# Patient Record
Sex: MALE | Race: WHITE | NOT HISPANIC OR LATINO
[De-identification: names, ages, dates, MRNs, and addresses within clinical notes are randomized per-mention and may not be internally consistent; named-entity substitution may affect disease eponyms.]

---

## 2017-03-13 PROBLEM — Z00.00 ENCOUNTER FOR PREVENTIVE HEALTH EXAMINATION: Status: ACTIVE | Noted: 2017-03-13

## 2017-03-15 ENCOUNTER — APPOINTMENT (OUTPATIENT)
Dept: HEMATOLOGY ONCOLOGY | Facility: CLINIC | Age: 72
End: 2017-03-15

## 2017-03-15 ENCOUNTER — RESULT REVIEW (OUTPATIENT)
Age: 72
End: 2017-03-15

## 2017-03-15 VITALS
WEIGHT: 204 LBS | RESPIRATION RATE: 12 BRPM | HEART RATE: 78 BPM | DIASTOLIC BLOOD PRESSURE: 80 MMHG | TEMPERATURE: 96.2 F | SYSTOLIC BLOOD PRESSURE: 139 MMHG

## 2017-03-15 DIAGNOSIS — R97.20 ELEVATED PROSTATE, SPECIFIC ANTIGEN [PSA]: ICD-10-CM

## 2017-03-15 DIAGNOSIS — N28.89 OTHER SPECIFIED DISORDERS OF KIDNEY AND URETER: ICD-10-CM

## 2017-03-15 DIAGNOSIS — R31.9 HEMATURIA, UNSPECIFIED: ICD-10-CM

## 2017-03-16 ENCOUNTER — RESULT REVIEW (OUTPATIENT)
Age: 72
End: 2017-03-16

## 2017-03-16 DIAGNOSIS — N39.0 URINARY TRACT INFECTION, SITE NOT SPECIFIED: ICD-10-CM

## 2017-03-16 LAB
APPEARANCE UR: NORMAL
BILIRUB UR QL STRIP: NEGATIVE
COLOR UR: NORMAL
GLUCOSE UR STRIP-MCNC: NEGATIVE MG/DL
HGB UR QL STRIP: ABNORMAL
KETONES UR STRIP-MCNC: NEGATIVE MG/DL
NITRITE UR QL STRIP: NEGATIVE
PH UR STRIP: 6
PROT UR STRIP-MCNC: >=300 MG/DL
PSA FREE FLD-MCNC: 0.95 NG/ML
PSA FREE FLD-MCNC: 12.1 %
PSA FREE MFR FLD: 7.82 NG/ML
RBC #/AREA URNS HPF: ABNORMAL P/HPF
SP GR UR STRIP: 1.02
URINE COMP/EPITH (NORTH): ABNORMAL
UROBILINOGEN UR STRIP-MCNC: 0.2 MG/DL
WBC URNS QL MICRO: ABNORMAL
WBC URNS QL MICRO: ABNORMAL P/HPF

## 2017-03-20 PROBLEM — N28.89 RENAL MASS, RIGHT: Status: ACTIVE | Noted: 2017-03-20

## 2017-03-20 LAB — BACTERIA UR CULT: NORMAL

## 2017-03-29 ENCOUNTER — APPOINTMENT (OUTPATIENT)
Dept: HEMATOLOGY ONCOLOGY | Facility: CLINIC | Age: 72
End: 2017-03-29

## 2017-03-29 VITALS
WEIGHT: 206 LBS | RESPIRATION RATE: 12 BRPM | HEIGHT: 72 IN | BODY MASS INDEX: 27.9 KG/M2 | HEART RATE: 67 BPM | TEMPERATURE: 97.5 F

## 2017-04-07 ENCOUNTER — OTHER (OUTPATIENT)
Age: 72
End: 2017-04-07

## 2017-04-19 ENCOUNTER — APPOINTMENT (OUTPATIENT)
Dept: HEMATOLOGY ONCOLOGY | Facility: CLINIC | Age: 72
End: 2017-04-19

## 2017-04-19 ENCOUNTER — RESULT REVIEW (OUTPATIENT)
Age: 72
End: 2017-04-19

## 2017-04-19 VITALS
SYSTOLIC BLOOD PRESSURE: 130 MMHG | WEIGHT: 204 LBS | DIASTOLIC BLOOD PRESSURE: 93 MMHG | BODY MASS INDEX: 27.63 KG/M2 | TEMPERATURE: 96.8 F | HEART RATE: 75 BPM | RESPIRATION RATE: 12 BRPM | HEIGHT: 72 IN

## 2017-04-19 DIAGNOSIS — C65.9 MALIGNANT NEOPLASM OF UNSPECIFIED RENAL PELVIS: ICD-10-CM

## 2017-04-20 ENCOUNTER — RESULT REVIEW (OUTPATIENT)
Age: 72
End: 2017-04-20

## 2017-04-20 LAB
APPEARANCE UR: NORMAL
BACTERIA URNS QL MICRO: ABNORMAL
BILIRUB UR QL STRIP: ABNORMAL
COLOR UR: NORMAL
GLUCOSE UR STRIP-MCNC: NEGATIVE MG/DL
HGB UR QL STRIP: ABNORMAL
KETONES UR STRIP-MCNC: NEGATIVE MG/DL
MUCOUS THREADS URNS QL MICRO: ABNORMAL
NITRITE UR QL STRIP: NEGATIVE
PH UR STRIP: 6
PROT UR STRIP-MCNC: >=300 MG/DL
RBC #/AREA URNS HPF: ABNORMAL P/HPF
SP GR UR STRIP: 1.02
URINE COMP/EPITH (NORTH): ABNORMAL
UROBILINOGEN UR STRIP-MCNC: 1 MG/DL
WBC URNS QL MICRO: ABNORMAL
WBC URNS QL MICRO: ABNORMAL P/HPF

## 2017-04-21 ENCOUNTER — APPOINTMENT (OUTPATIENT)
Dept: HEMATOLOGY ONCOLOGY | Facility: CLINIC | Age: 72
End: 2017-04-21

## 2017-04-21 VITALS
BODY MASS INDEX: 27.63 KG/M2 | HEIGHT: 72 IN | SYSTOLIC BLOOD PRESSURE: 140 MMHG | WEIGHT: 204 LBS | HEART RATE: 79 BPM | RESPIRATION RATE: 12 BRPM | DIASTOLIC BLOOD PRESSURE: 94 MMHG | TEMPERATURE: 97 F

## 2017-04-21 DIAGNOSIS — Z78.9 OTHER SPECIFIED HEALTH STATUS: ICD-10-CM

## 2017-04-21 DIAGNOSIS — K50.90 CROHN'S DISEASE, UNSPECIFIED, W/OUT COMPLICATIONS: ICD-10-CM

## 2017-04-21 DIAGNOSIS — Z87.09 PERSONAL HISTORY OF OTHER DISEASES OF THE RESPIRATORY SYSTEM: ICD-10-CM

## 2017-04-21 DIAGNOSIS — Z72.0 TOBACCO USE: ICD-10-CM

## 2017-04-21 DIAGNOSIS — J98.4 OTHER DISORDERS OF LUNG: ICD-10-CM

## 2017-04-21 DIAGNOSIS — K21.9 GASTRO-ESOPHAGEAL REFLUX DISEASE W/OUT ESOPHAGITIS: ICD-10-CM

## 2017-04-21 DIAGNOSIS — Z80.1 FAMILY HISTORY OF MALIGNANT NEOPLASM OF TRACHEA, BRONCHUS AND LUNG: ICD-10-CM

## 2017-04-27 ENCOUNTER — OUTPATIENT (OUTPATIENT)
Dept: OUTPATIENT SERVICES | Facility: HOSPITAL | Age: 72
LOS: 1 days | Discharge: HOME | End: 2017-04-27

## 2017-05-03 ENCOUNTER — OUTPATIENT (OUTPATIENT)
Dept: OUTPATIENT SERVICES | Facility: HOSPITAL | Age: 72
LOS: 1 days | Discharge: HOME | End: 2017-05-03

## 2017-05-03 ENCOUNTER — APPOINTMENT (OUTPATIENT)
Dept: HEMATOLOGY ONCOLOGY | Facility: CLINIC | Age: 72
End: 2017-05-03

## 2017-05-03 VITALS
TEMPERATURE: 98 F | HEIGHT: 72 IN | HEART RATE: 69 BPM | SYSTOLIC BLOOD PRESSURE: 128 MMHG | WEIGHT: 204 LBS | RESPIRATION RATE: 12 BRPM | BODY MASS INDEX: 27.63 KG/M2 | DIASTOLIC BLOOD PRESSURE: 90 MMHG

## 2017-05-04 ENCOUNTER — OUTPATIENT (OUTPATIENT)
Dept: OUTPATIENT SERVICES | Facility: HOSPITAL | Age: 72
LOS: 1 days | Discharge: HOME | End: 2017-05-04

## 2017-05-09 ENCOUNTER — APPOINTMENT (OUTPATIENT)
Dept: HEMATOLOGY ONCOLOGY | Facility: CLINIC | Age: 72
End: 2017-05-09

## 2017-06-28 DIAGNOSIS — C64.1 MALIGNANT NEOPLASM OF RIGHT KIDNEY, EXCEPT RENAL PELVIS: ICD-10-CM

## 2017-06-28 DIAGNOSIS — N28.89 OTHER SPECIFIED DISORDERS OF KIDNEY AND URETER: ICD-10-CM

## 2017-07-12 DIAGNOSIS — R31.9 HEMATURIA, UNSPECIFIED: ICD-10-CM

## 2017-07-12 DIAGNOSIS — C65.9 MALIGNANT NEOPLASM OF UNSPECIFIED RENAL PELVIS: ICD-10-CM

## 2017-07-25 DIAGNOSIS — Z01.818 ENCOUNTER FOR OTHER PREPROCEDURAL EXAMINATION: ICD-10-CM

## 2018-04-17 ENCOUNTER — INPATIENT (INPATIENT)
Facility: HOSPITAL | Age: 73
LOS: 1 days | Discharge: HOME | End: 2018-04-19
Attending: INTERNAL MEDICINE | Admitting: INTERNAL MEDICINE

## 2018-04-17 VITALS
SYSTOLIC BLOOD PRESSURE: 97 MMHG | TEMPERATURE: 98 F | RESPIRATION RATE: 20 BRPM | DIASTOLIC BLOOD PRESSURE: 62 MMHG | OXYGEN SATURATION: 100 % | HEART RATE: 93 BPM
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
## 2018-04-17 DIAGNOSIS — Z87.438 PERSONAL HISTORY OF OTHER DISEASES OF MALE GENITAL ORGANS: Chronic | ICD-10-CM

## 2018-04-17 DIAGNOSIS — Z98.890 OTHER SPECIFIED POSTPROCEDURAL STATES: Chronic | ICD-10-CM

## 2018-04-17 DIAGNOSIS — K41.20 BILATERAL FEMORAL HERNIA, WITHOUT OBSTRUCTION OR GANGRENE, NOT SPECIFIED AS RECURRENT: Chronic | ICD-10-CM

## 2018-04-17 LAB
ANION GAP SERPL CALC-SCNC: 13 MMOL/L — SIGNIFICANT CHANGE UP (ref 7–14)
APTT BLD: 29.9 SEC — SIGNIFICANT CHANGE UP (ref 27–39.2)
BASE EXCESS BLDV CALC-SCNC: 0.1 MMOL/L — SIGNIFICANT CHANGE UP (ref -2–2)
BASOPHILS # BLD AUTO: 0.04 K/UL — SIGNIFICANT CHANGE UP (ref 0–0.2)
BASOPHILS NFR BLD AUTO: 0.2 % — SIGNIFICANT CHANGE UP (ref 0–1)
BUN SERPL-MCNC: 23 MG/DL — HIGH (ref 10–20)
CA-I SERPL-SCNC: 1.12 MMOL/L — SIGNIFICANT CHANGE UP (ref 1.12–1.3)
CALCIUM SERPL-MCNC: 7.9 MG/DL — LOW (ref 8.5–10.1)
CHLORIDE SERPL-SCNC: 95 MMOL/L — LOW (ref 98–110)
CO2 SERPL-SCNC: 22 MMOL/L — SIGNIFICANT CHANGE UP (ref 17–32)
CREAT SERPL-MCNC: 1.4 MG/DL — SIGNIFICANT CHANGE UP (ref 0.7–1.5)
EOSINOPHIL # BLD AUTO: 0.23 K/UL — SIGNIFICANT CHANGE UP (ref 0–0.7)
EOSINOPHIL NFR BLD AUTO: 1.4 % — SIGNIFICANT CHANGE UP (ref 0–8)
GAS PNL BLDV: 133 MMOL/L — LOW (ref 136–145)
GAS PNL BLDV: SIGNIFICANT CHANGE UP
GLUCOSE SERPL-MCNC: 104 MG/DL — HIGH (ref 70–99)
HCO3 BLDV-SCNC: 23 MMOL/L — SIGNIFICANT CHANGE UP (ref 22–29)
HCT VFR BLD CALC: 28.5 % — LOW (ref 42–52)
HCT VFR BLDA CALC: 26.9 % — LOW (ref 34–44)
HGB BLD CALC-MCNC: 8.8 G/DL — LOW (ref 14–18)
HGB BLD-MCNC: 8.9 G/DL — LOW (ref 14–18)
IMM GRANULOCYTES NFR BLD AUTO: 0.7 % — HIGH (ref 0.1–0.3)
INR BLD: 1.34 RATIO — HIGH (ref 0.65–1.3)
LACTATE BLDV-MCNC: 1 MMOL/L — SIGNIFICANT CHANGE UP (ref 0.5–1.6)
LYMPHOCYTES # BLD AUTO: 0.72 K/UL — LOW (ref 1.2–3.4)
LYMPHOCYTES # BLD AUTO: 4.4 % — LOW (ref 20.5–51.1)
MCHC RBC-ENTMCNC: 28.4 PG — SIGNIFICANT CHANGE UP (ref 27–31)
MCHC RBC-ENTMCNC: 31.2 G/DL — LOW (ref 32–37)
MCV RBC AUTO: 91.1 FL — SIGNIFICANT CHANGE UP (ref 80–94)
MONOCYTES # BLD AUTO: 0.82 K/UL — HIGH (ref 0.1–0.6)
MONOCYTES NFR BLD AUTO: 5 % — SIGNIFICANT CHANGE UP (ref 1.7–9.3)
NEUTROPHILS # BLD AUTO: 14.37 K/UL — HIGH (ref 1.4–6.5)
NEUTROPHILS NFR BLD AUTO: 88.3 % — HIGH (ref 42.2–75.2)
PCO2 BLDV: 29 MMHG — LOW (ref 41–51)
PH BLDV: 7.5 — HIGH (ref 7.26–7.43)
PLATELET # BLD AUTO: 273 K/UL — SIGNIFICANT CHANGE UP (ref 130–400)
PO2 BLDV: 179 MMHG — HIGH (ref 20–40)
POTASSIUM BLDV-SCNC: 3.6 MMOL/L — SIGNIFICANT CHANGE UP (ref 3.3–5.6)
POTASSIUM SERPL-MCNC: 8.3 MMOL/L — CRITICAL HIGH (ref 3.5–5)
POTASSIUM SERPL-SCNC: 8.3 MMOL/L — CRITICAL HIGH (ref 3.5–5)
PROTHROM AB SERPL-ACNC: 14.6 SEC — HIGH (ref 9.95–12.87)
RBC # BLD: 3.13 M/UL — LOW (ref 4.7–6.1)
RBC # FLD: 17 % — HIGH (ref 11.5–14.5)
SAO2 % BLDV: 100 % — SIGNIFICANT CHANGE UP
SODIUM SERPL-SCNC: 130 MMOL/L — LOW (ref 135–146)
TYPE + AB SCN PNL BLD: SIGNIFICANT CHANGE UP
WBC # BLD: 16.3 K/UL — HIGH (ref 4.8–10.8)
WBC # FLD AUTO: 16.3 K/UL — HIGH (ref 4.8–10.8)

## 2018-04-17 RX ORDER — METOCLOPRAMIDE HCL 10 MG
10 TABLET ORAL ONCE
Qty: 0 | Refills: 0 | Status: COMPLETED | OUTPATIENT
Start: 2018-04-17 | End: 2018-04-17

## 2018-04-17 RX ORDER — SODIUM CHLORIDE 9 MG/ML
1000 INJECTION INTRAMUSCULAR; INTRAVENOUS; SUBCUTANEOUS
Qty: 0 | Refills: 0 | Status: DISCONTINUED | OUTPATIENT
Start: 2018-04-17 | End: 2018-04-19

## 2018-04-17 RX ORDER — SODIUM CHLORIDE 9 MG/ML
1000 INJECTION INTRAMUSCULAR; INTRAVENOUS; SUBCUTANEOUS ONCE
Qty: 0 | Refills: 0 | Status: COMPLETED | OUTPATIENT
Start: 2018-04-17 | End: 2018-04-17

## 2018-04-17 RX ADMIN — Medication 10 MILLIGRAM(S): at 22:46

## 2018-04-17 RX ADMIN — SODIUM CHLORIDE 100 MILLILITER(S): 9 INJECTION INTRAMUSCULAR; INTRAVENOUS; SUBCUTANEOUS at 23:28

## 2018-04-17 RX ADMIN — SODIUM CHLORIDE 2000 MILLILITER(S): 9 INJECTION INTRAMUSCULAR; INTRAVENOUS; SUBCUTANEOUS at 19:53

## 2018-04-17 NOTE — H&P ADULT - NSHPLABSRESULTS_GEN_ALL_CORE
8.9    16.30 )-----------( 273      ( 17 Apr 2018 18:40 )             28.5     04-17  130<L>  |  95<L>  |  23<H>  ----------------------------<  104<H>  8.3<HH>   |  22  |  1.4  Ca    7.9<L>      17 Apr 2018 18:40  PT/INR - ( 17 Apr 2018 18:40 )   PT: 14.60 sec;   INR: 1.34 ratio    PTT - ( 17 Apr 2018 18:40 )  PTT:29.9 sec

## 2018-04-17 NOTE — ED PROVIDER NOTE - PHYSICAL EXAMINATION
CONSTITUTIONAL: Well-developed; well-nourished; in no acute distress.   SKIN: warm, dry  EYES: no conj injection. EOMI, pale conjunctiva  ENT: No nasal discharge; airway clear.  CARD: S1, S2 normal  RESP: clear to auscultation bilaterally   ABD: soft nondistended. nontender. no rebound or guarding  EXT: Normal ROM.  5/5 strength bilaterally.  normal gait.   NEURO: Alert, oriented, grossly unremarkable

## 2018-04-17 NOTE — H&P ADULT - NSHPPHYSICALEXAM_GEN_ALL_CORE
General: underweight, pale gentleman, reclining in bed, in NAD  Cardiac: RRR, S1S2  Lungs: CTAB  Abd: NTND, +BS, no pain on palpation of all 4 quadrants  LE: no swelling  Neuro: AAOx3, no focal deficits General: underweight, pale gentleman, reclining in bed, in NAD  Cardiac: RRR, S1S2  Lungs: CTAB  Abd: NTND, +BS, no pain on palpation of all 4 quadrants, guaiac neg  LE: no swelling  Neuro: AAOx3, no focal deficits General: underweight, pale gentleman, reclining in bed, in NAD  Cardiac: RRR, S1S2  Lungs: CTAB  Abd: NTND, +BS, no pain on palpation of all 4 quadrants, guaiac neg, +constipation  LE: no swelling  Neuro: AAOx3, no focal deficits

## 2018-04-17 NOTE — H&P ADULT - ATTENDING COMMENTS
Patient was evaluated and examined by bedside, no c/o abdominal pain, tolerating diet well.    All labs, radiology studies, VS was reviewed  I have reviewed medical plan outlined by Medical resident as stated above.  -Significant weight loss with h/o malignancy, f/up with repeated CT abdomen and pelvis  -CXR/ f/up U/A  -advance diet slowly as tolerated  -recently dx. gastric/duodenal ulcers- on PPI/Carafate tx.  -Anemia with decreased Hemoglobin level - no gross bleeding, f/up anemia work up, CBC in am  -Cachexia- moderate malnourishment- ensure supplementation

## 2018-04-17 NOTE — H&P ADULT - HISTORY OF PRESENT ILLNESS
73 y/o M with PMHx R renal cell carcinoma s/p radiation dx March 2016, last radiation treatment in August 2017, finding of GI ulcer 2/2 radiation, inactive Crohns, acid reflux, and asthma presenting for chronic nausea, vomiting, and decreased appetite. Since the radiation, pt has been experiencing nausea and vomiting since February. He does not always vomit, but some foods like eggs will trigger the vomiting; it is nonbilious but there are sometimes streaks of blood at the end of the vomitus. Pt also states he has since lost 40 pounds unintentionally. For these sx pt had EGD done by GI Dr Villanueva 2 weeks ago, which as per him showed 1 oozing ulcer but no active bleeding. Pt's last colonoscopy was done 3 years ago, which showed +Crohns and 2 polyps, but no malignancy; he is scheduled for a repeat colo this year. The pt states he has also developed iron def anemia after the radiation requiring venofer. Pt now sent into the ER by Dr Villanueva bc his sx of nausea, vomiting, and decreased po intake have been nonremiting. As per pt, Dr Villanueva attributes all of these sx to radiation side effects.

## 2018-04-17 NOTE — ED PROVIDER NOTE - MEDICAL DECISION MAKING DETAILS
vomtiing s/p radiation with duodenitis, unable to tolerate po, will admit for iv hydration, antiemetics, and for Dr. Villanueva to evaluate for possible need for endoscopy vs diet modification.

## 2018-04-17 NOTE — H&P ADULT - NSHPREVIEWOFSYSTEMS_GEN_ALL_CORE
General: +40 pound unintentional weight loss, neg fevers, chills  Cardiac: no chest pain, dyspnea  Lungs: no resp distress  Abd: +nausea and vomiting (occurs every other day or two, some streaks of blood at end of vomiting, +burning at L side of abdomen from his ulcer

## 2018-04-17 NOTE — ED PROVIDER NOTE - NS ED ROS FT
ENMT:  no otalgia. no sore throat  Cardiac:  No chest pain or sob.  Respiratory:  No cough or respiratory distress.  GI:  (+) nausea and vomiting. (+) chronic constipation.  no abd pain  :  normal urination. no burning   MS:  no myalgia. no back pain  Neuro:  No headache or focal weakness  Skin:  No skin rash.

## 2018-04-17 NOTE — ED PROVIDER NOTE - OBJECTIVE STATEMENT
72 M pmh renal carcinoma s/p radiation and anemia presents for nausea, vomiting and weight loss.  patient reports last radiation last may.  patient with persistent nausea and vomiting every several days.  not tolerating many foods po.  reports recent endoscopy with dr odell that showed bleeding of duodenum.  patient reports dark stool, on iron supplementation.  denies abd pain.  no diarrhea, h/o constipation.  no fever/chills

## 2018-04-17 NOTE — H&P ADULT - ASSESSMENT
73 y/o M with PMHx R renal cell carcinoma s/p radiation dx March 2016, last radiation treatment in August 2017, finding of GI ulcer 2/2 radiation, inactive Crohns, acid reflux, and asthma presenting for chronic nausea, vomiting, and decreased appetite since February. For these sx pt had EGD done by GI Dr Villanueva 2 weeks ago, which as per him showed 1 oozing ulcer but no active bleeding.    1. Nausea, Vomiting, Decreased PO Intake Likely 2/2 Radiation Side Effects   -sx control for now, IVF, antiemetics, c/w carafate, protonix in place of ranitidine  -call Dr Villanueva office [place] am ([phone]) and obtain official results of EGD results and for any further recommendations  -advance diet slowly, calorie count, dietary referral  2. Crohn's Disease: c/w mesalamine  3. Asthma: c/w symbicort, proair, flonase, montelukast  4. 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 For these sx pt had EGD done by GI Dr Villanueva 2 weeks ago, which as per him showed 1 oozing ulcer but no active bleeding.    1. Nausea, Vomiting, Decreased PO Intake Likely 2/2 Radiation Side Effects, Resulting in Dehydration, Hyponatremia  -sx control for now, IVF, antiemetics, c/w carafate, protonix in place of ranitidine  -call Dr Villanueva office [place] am ([phone]) and obtain official results of EGD results and for any further recommendations  -advance diet slowly, calorie count, dietary referral  -leukocytosis 2/2 stress reaction, no signs of infection, pt appears nontoxic   2. Crohn's Disease: c/w mesalamine  3. Asthma: c/w symbicort, proair, flonase, montelukast  4. 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 DVT PPx: hep 5000q8  6. hyperkalemia: hemolyzed, repeat wnl 71 y/o M with PMHx R renal cell carcinoma s/p radiation dx March 2016, last radiation treatment in August 2017, finding of GI ulcer 2/2 radiation, inactive Crohns, acid reflux, and asthma presenting for chronic nausea, vomiting, and decreased appetite since February. For these sx pt had EGD done by GI Dr Villanueva 2 weeks ago, which as per him showed 1 oozing ulcer but no active bleeding.    1. Nausea, Vomiting, Decreased PO Intake Likely 2/2 Radiation Side Effects, Resulting in Dehydration, Hyponatremia  -sx control for now, IVF, antiemetics, c/w carafate, protonix in place of ranitidine  -call Dr Villanueva office [place] am ([phone]) and obtain official results of EGD results and for any further recommendations  -advance diet slowly, calorie count, dietary referral  -leukocytosis 2/2 stress reaction, no signs of infection, pt appears nontoxic   2. Crohn's Disease: c/w mesalamine  3. Asthma: c/w symbicort, proair, flonase, montelukast  4. 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 DVT PPx: hep 5000q8  6. hyperkalemia: hemolyzed, repeat wnl  7. CKD Stage 3A: avoid nephrotoxic agents

## 2018-04-18 LAB
ANION GAP SERPL CALC-SCNC: 10 MMOL/L — SIGNIFICANT CHANGE UP (ref 7–14)
APPEARANCE UR: (no result)
BACTERIA # UR AUTO: (no result) /HPF
BASOPHILS # BLD AUTO: 0.02 K/UL — SIGNIFICANT CHANGE UP (ref 0–0.2)
BASOPHILS NFR BLD AUTO: 0.2 % — SIGNIFICANT CHANGE UP (ref 0–1)
BILIRUB UR-MCNC: NEGATIVE — SIGNIFICANT CHANGE UP
BUN SERPL-MCNC: 19 MG/DL — SIGNIFICANT CHANGE UP (ref 10–20)
CALCIUM SERPL-MCNC: 7.6 MG/DL — LOW (ref 8.5–10.1)
CHLORIDE SERPL-SCNC: 101 MMOL/L — SIGNIFICANT CHANGE UP (ref 98–110)
CO2 SERPL-SCNC: 23 MMOL/L — SIGNIFICANT CHANGE UP (ref 17–32)
COLOR SPEC: YELLOW — SIGNIFICANT CHANGE UP
CREAT SERPL-MCNC: 1.2 MG/DL — SIGNIFICANT CHANGE UP (ref 0.7–1.5)
DIFF PNL FLD: NEGATIVE — SIGNIFICANT CHANGE UP
EOSINOPHIL # BLD AUTO: 0.36 K/UL — SIGNIFICANT CHANGE UP (ref 0–0.7)
EOSINOPHIL NFR BLD AUTO: 3.1 % — SIGNIFICANT CHANGE UP (ref 0–8)
EPI CELLS # UR: (no result) /HPF
GLUCOSE SERPL-MCNC: 97 MG/DL — SIGNIFICANT CHANGE UP (ref 70–99)
GLUCOSE UR QL: NEGATIVE MG/DL — SIGNIFICANT CHANGE UP
HCT VFR BLD CALC: 23.8 % — LOW (ref 42–52)
HGB BLD-MCNC: 7.6 G/DL — LOW (ref 14–18)
IMM GRANULOCYTES NFR BLD AUTO: 0.6 % — HIGH (ref 0.1–0.3)
IRON SATN MFR SERPL: 10 % — LOW (ref 15–50)
IRON SATN MFR SERPL: 15 UG/DL — LOW (ref 35–150)
KETONES UR-MCNC: NEGATIVE — SIGNIFICANT CHANGE UP
LEUKOCYTE ESTERASE UR-ACNC: (no result)
LYMPHOCYTES # BLD AUTO: 0.58 K/UL — LOW (ref 1.2–3.4)
LYMPHOCYTES # BLD AUTO: 5 % — LOW (ref 20.5–51.1)
MAGNESIUM SERPL-MCNC: 2 MG/DL — SIGNIFICANT CHANGE UP (ref 1.8–2.4)
MCHC RBC-ENTMCNC: 29 PG — SIGNIFICANT CHANGE UP (ref 27–31)
MCHC RBC-ENTMCNC: 31.9 G/DL — LOW (ref 32–37)
MCV RBC AUTO: 90.8 FL — SIGNIFICANT CHANGE UP (ref 80–94)
MONOCYTES # BLD AUTO: 0.7 K/UL — HIGH (ref 0.1–0.6)
MONOCYTES NFR BLD AUTO: 6.1 % — SIGNIFICANT CHANGE UP (ref 1.7–9.3)
NEUTROPHILS # BLD AUTO: 9.77 K/UL — HIGH (ref 1.4–6.5)
NEUTROPHILS NFR BLD AUTO: 85 % — HIGH (ref 42.2–75.2)
NITRITE UR-MCNC: NEGATIVE — SIGNIFICANT CHANGE UP
PH UR: 6 — SIGNIFICANT CHANGE UP (ref 5–8)
PLATELET # BLD AUTO: 223 K/UL — SIGNIFICANT CHANGE UP (ref 130–400)
POTASSIUM SERPL-MCNC: 4.4 MMOL/L — SIGNIFICANT CHANGE UP (ref 3.5–5)
POTASSIUM SERPL-SCNC: 4.4 MMOL/L — SIGNIFICANT CHANGE UP (ref 3.5–5)
PROT UR-MCNC: 30 MG/DL
RBC # BLD: 2.62 M/UL — LOW (ref 4.7–6.1)
RBC # FLD: 16.7 % — HIGH (ref 11.5–14.5)
SODIUM SERPL-SCNC: 134 MMOL/L — LOW (ref 135–146)
SP GR SPEC: 1.02 — SIGNIFICANT CHANGE UP (ref 1.01–1.03)
TIBC SERPL-MCNC: 155 UG/DL — LOW (ref 220–430)
UIBC SERPL-MCNC: 140 UG/DL — SIGNIFICANT CHANGE UP (ref 110–370)
UROBILINOGEN FLD QL: 0.2 MG/DL — SIGNIFICANT CHANGE UP (ref 0.2–0.2)
WBC # BLD: 11.5 K/UL — HIGH (ref 4.8–10.8)
WBC # FLD AUTO: 11.5 K/UL — HIGH (ref 4.8–10.8)
WBC UR QL: (no result) /HPF

## 2018-04-18 RX ORDER — MONTELUKAST 4 MG/1
10 TABLET, CHEWABLE ORAL AT BEDTIME
Qty: 0 | Refills: 0 | Status: DISCONTINUED | OUTPATIENT
Start: 2018-04-18 | End: 2018-04-19

## 2018-04-18 RX ORDER — SUCRALFATE 1 G
1 TABLET ORAL
Qty: 0 | Refills: 0 | Status: DISCONTINUED | OUTPATIENT
Start: 2018-04-18 | End: 2018-04-19

## 2018-04-18 RX ORDER — MESALAMINE 400 MG
2400 TABLET, DELAYED RELEASE (ENTERIC COATED) ORAL DAILY
Qty: 0 | Refills: 0 | Status: DISCONTINUED | OUTPATIENT
Start: 2018-04-18 | End: 2018-04-18

## 2018-04-18 RX ORDER — PANTOPRAZOLE SODIUM 20 MG/1
40 TABLET, DELAYED RELEASE ORAL
Qty: 0 | Refills: 0 | Status: DISCONTINUED | OUTPATIENT
Start: 2018-04-18 | End: 2018-04-18

## 2018-04-18 RX ORDER — HEPARIN SODIUM 5000 [USP'U]/ML
5000 INJECTION INTRAVENOUS; SUBCUTANEOUS EVERY 8 HOURS
Qty: 0 | Refills: 0 | Status: DISCONTINUED | OUTPATIENT
Start: 2018-04-18 | End: 2018-04-19

## 2018-04-18 RX ORDER — PANTOPRAZOLE SODIUM 20 MG/1
40 TABLET, DELAYED RELEASE ORAL
Qty: 0 | Refills: 0 | Status: DISCONTINUED | OUTPATIENT
Start: 2018-04-18 | End: 2018-04-19

## 2018-04-18 RX ORDER — FERROUS SULFATE 325(65) MG
325 TABLET ORAL DAILY
Qty: 0 | Refills: 0 | Status: DISCONTINUED | OUTPATIENT
Start: 2018-04-18 | End: 2018-04-19

## 2018-04-18 RX ORDER — ONDANSETRON 8 MG/1
4 TABLET, FILM COATED ORAL EVERY 6 HOURS
Qty: 0 | Refills: 0 | Status: DISCONTINUED | OUTPATIENT
Start: 2018-04-18 | End: 2018-04-19

## 2018-04-18 RX ORDER — MESALAMINE 400 MG
800 TABLET, DELAYED RELEASE (ENTERIC COATED) ORAL THREE TIMES A DAY
Qty: 0 | Refills: 0 | Status: DISCONTINUED | OUTPATIENT
Start: 2018-04-18 | End: 2018-04-19

## 2018-04-18 RX ORDER — ALBUTEROL 90 UG/1
2 AEROSOL, METERED ORAL EVERY 6 HOURS
Qty: 0 | Refills: 0 | Status: DISCONTINUED | OUTPATIENT
Start: 2018-04-18 | End: 2018-04-19

## 2018-04-18 RX ORDER — BUDESONIDE AND FORMOTEROL FUMARATE DIHYDRATE 160; 4.5 UG/1; UG/1
2 AEROSOL RESPIRATORY (INHALATION)
Qty: 0 | Refills: 0 | Status: DISCONTINUED | OUTPATIENT
Start: 2018-04-18 | End: 2018-04-19

## 2018-04-18 RX ORDER — ONDANSETRON 8 MG/1
8 TABLET, FILM COATED ORAL ONCE
Qty: 0 | Refills: 0 | Status: COMPLETED | OUTPATIENT
Start: 2018-04-18 | End: 2018-04-18

## 2018-04-18 RX ORDER — ONDANSETRON 8 MG/1
8 TABLET, FILM COATED ORAL EVERY 6 HOURS
Qty: 0 | Refills: 0 | Status: DISCONTINUED | OUTPATIENT
Start: 2018-04-18 | End: 2018-04-18

## 2018-04-18 RX ADMIN — PANTOPRAZOLE SODIUM 40 MILLIGRAM(S): 20 TABLET, DELAYED RELEASE ORAL at 19:18

## 2018-04-18 RX ADMIN — Medication 1 GRAM(S): at 20:32

## 2018-04-18 RX ADMIN — Medication 325 MILLIGRAM(S): at 12:54

## 2018-04-18 RX ADMIN — PANTOPRAZOLE SODIUM 40 MILLIGRAM(S): 20 TABLET, DELAYED RELEASE ORAL at 06:19

## 2018-04-18 RX ADMIN — Medication 800 MILLIGRAM(S): at 17:02

## 2018-04-18 RX ADMIN — Medication 1 GRAM(S): at 12:54

## 2018-04-18 RX ADMIN — BUDESONIDE AND FORMOTEROL FUMARATE DIHYDRATE 2 PUFF(S): 160; 4.5 AEROSOL RESPIRATORY (INHALATION) at 08:44

## 2018-04-18 RX ADMIN — Medication 1 GRAM(S): at 06:19

## 2018-04-18 RX ADMIN — ONDANSETRON 108 MILLIGRAM(S): 8 TABLET, FILM COATED ORAL at 17:09

## 2018-04-18 NOTE — CHART NOTE - NSCHARTNOTEFT_GEN_A_CORE
Upon Nutritional Assessment by the Registered Dietitian your patient was determined to meet criteria / has evidence of the following diagnosis/diagnoses:          [ ]  Mild Protein Calorie Malnutrition        [ ]  Moderate Protein Calorie Malnutrition        [x] Severe Protein Calorie Malnutrition        [ ] Unspecified Protein Calorie Malnutrition        [ ] Underweight / BMI <19        [ ] Morbid Obesity / BMI > 40      Findings as based on:  •  Comprehensive nutrition assessment and consultation    Pt with severe protein calorie malnutrition of chronic illness as pt with po ~50% over 3 months and wt loss of 18.9% (35 lbs) within 3 months.      Treatment:    The following diet has been recommended:  Please consider changing diet order to dysphagia 2 with no concentrated potassium foods and order Nepro Vanilla BID and Ensure Pudding Vanilla q 24hrs.      PROVIDER Section:     By signing this assessment you are acknowledging and agree with the diagnosis/diagnoses assigned by the Registered Dietitian    Comments:

## 2018-04-18 NOTE — CONSULT NOTE ADULT - ASSESSMENT
73 y/o M with PMHx R renal cell carcinoma s/p radiation dx March 2017, last radiation treatment in August 2017, finding of GI ulcer 2/2 radiation, inactive Crohns, acid reflux, and asthma presenting for chronic nausea, vomiting with streaks of blood, and decreased appetite with weight loss. To note Pt's last endoscopy- 2 weeks ago revealed: Multiple duodenal ulcers- severe, Erosive gastritis.     1) Erosive gastritis/Multiple duodenal ulcers/ friable mucosa- probably 2/2 to radiation  Please refer to endoscopy results in chart  Hb closer to baseline. Guaiac negative on admission.  No evidence of active bleed.   c/w protonix po q12h, sucralfate  Pending ulcer biopsy results  Avoid Nsaids/Aspirin  Follow with outpt GI Dr Villanueva 71 y/o M with PMHx R renal cell carcinoma s/p radiation dx March 2017, last radiation treatment in August 2017, finding of GI ulcer 2/2 radiation, inactive Crohns, acid reflux, and asthma presenting for chronic nausea, vomiting with streaks of blood, and decreased appetite with weight loss. To note Pt's last endoscopy- 2 weeks ago revealed: Multiple duodenal ulcers- severe, Erosive gastritis.     1) Erosive gastritis/Multiple duodenal ulcers/ friable mucosa- probably 2/2 to radiation  Repeat endoscopy recommended but declined by the patient.  Should his pain return and he is amenable we will readdress.  Hb closer to baseline. Guaiac negative on admission.  No evidence of active bleed.   c/w protonix po q12h, sucralfate  Pending ulcer biopsy results  Avoid Nsaids/Aspirin  Follow with outpt GI Dr Villanueva

## 2018-04-18 NOTE — DIETITIAN INITIAL EVALUATION ADULT. - ENERGY NEEDS
(8903-4427 kcal/day = MSJ x 1.2-1.3 AF + 250 to promote wt gain of 0.5 lbs/wk)  ( g/day = 15-20% of energy needs); fluid needs: 1 ml :1 kcal or per LIP

## 2018-04-18 NOTE — CONSULT NOTE ADULT - ATTENDING COMMENTS
Pt seen and examined with Med Res and GI team.  I agree with management plan.  Pts symptoms have now resolved and although recommended he has declined repeat procedure stating that he his here to build up his nutritional status.  Continue PPI.  Call Heme/Onc.  Call nutrition consult.

## 2018-04-18 NOTE — DIETITIAN INITIAL EVALUATION ADULT. - ORAL INTAKE PTA
poor/pt reports intake decreased since January consuming 50% or less of prior baseline intake, pt mainly consuming oatmeal, farina, milkshakes, ice cream, protein shakes (mainly consuming bland carb foods and high calorie shakes as pt was able to tolerate these foods)

## 2018-04-18 NOTE — DIETITIAN INITIAL EVALUATION ADULT. - OTHER INFO
Pt reports UBW ~185 lbs in January with decreased appetite/intake and GI distress / less tolerance to foods. Pt with persistent nausea and vomiting (last episode of emesis 4/16). Pt reports receiving radiation for kidney cancer and believes that has irritated GI tract and causes pt to get sick when consuming certain foods. (Reason for assessment: consult for assessment, calorie count, edu) Pt reports UBW ~185 lbs in January with decreased appetite/intake and GI distress / less tolerance to foods. Pt with persistent nausea and vomiting (last episode of emesis 4/16). Pt reports receiving radiation for kidney cancer and believes that has irritated GI tract and causes pt to get sick when consuming certain foods. Calorie count hung: results due 4/21. (Reason for assessment: consult for assessment, calorie count, edu)

## 2018-04-18 NOTE — DIETITIAN INITIAL EVALUATION ADULT. - ADHERENCE
pt reports restriction of beef to diet over the past few months (he was advised to avoid as it takes long to digest)

## 2018-04-18 NOTE — CONSULT NOTE ADULT - ASSESSMENT
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  anemia    NS at 100 cc/hr x 24-48 hours  PPI  GI eval  avoid nsaids  salty snacks  spoke with resident

## 2018-04-18 NOTE — PROGRESS NOTE ADULT - ASSESSMENT
73 y/o M with PMHx R renal cell carcinoma s/p radiation dx March 2016, last radiation treatment in August 2017, finding of GI ulcer 2/2 radiation, inactive Crohns, acid reflux, and asthma presenting for chronic nausea, vomiting, and decreased appetite since February. For these sx pt had EGD done by GI Dr Villanueva 2 weeks ago, which as per him showed 1 oozing ulcer but no active bleeding.    1. Nausea, Vomiting, Decreased PO Intake  -sx control for now, IVF, antiemetics, c/w carafate, Protonix BID   - Received Medical records from Dr Villanueva   -advance diet slowly, calorie count, dietary referral  -leukocytosis 2/2 stress reaction, no signs of infection, pt appears nontoxic     2.Iron Deficiency Anemia after Radiation:   -baseline hgb before radiation was 10-11, currently at new baseline of 8, no signs of lower GI bleed, guaiac neg  - Iron studies check CBC tomorrow     3 H/o Weight loss   40 lbs in couple of months   - Can be metastasis of cancer  - CXR and CT abdomne and pelvis   - SPoke with the patient he had an PET scan at [facility] and it was in remission     4. Crohn's Disease: c/w mesalamine    5. Asthma: c/w symbicort, proair, montelukast    6. DVT PPx: hep 5000q8    7. CKD Stage 3A: avoid nephrotoxic agents    8. Dispo- Home

## 2018-04-19 ENCOUNTER — TRANSCRIPTION ENCOUNTER (OUTPATIENT)
Age: 73
End: 2018-04-19

## 2018-04-19 VITALS — OXYGEN SATURATION: 99 %

## 2018-04-19 LAB
FOLATE SERPL-MCNC: 7.2 NG/ML — SIGNIFICANT CHANGE UP
VIT B12 SERPL-MCNC: 445 PG/ML — SIGNIFICANT CHANGE UP (ref 232–1245)

## 2018-04-19 RX ORDER — MONTELUKAST 4 MG/1
1 TABLET, CHEWABLE ORAL
Qty: 0 | Refills: 0 | COMMUNITY
Start: 2018-04-19

## 2018-04-19 RX ORDER — ONDANSETRON 8 MG/1
4 TABLET, FILM COATED ORAL
Qty: 84 | Refills: 0 | OUTPATIENT
Start: 2018-04-19 | End: 2018-04-25

## 2018-04-19 RX ORDER — PANTOPRAZOLE SODIUM 20 MG/1
1 TABLET, DELAYED RELEASE ORAL
Qty: 20 | Refills: 0 | OUTPATIENT
Start: 2018-04-19 | End: 2018-04-28

## 2018-04-19 RX ORDER — MESALAMINE 400 MG
2 TABLET, DELAYED RELEASE (ENTERIC COATED) ORAL
Qty: 0 | Refills: 0 | COMMUNITY
Start: 2018-04-19

## 2018-04-19 RX ORDER — FERROUS SULFATE 325(65) MG
1 TABLET ORAL
Qty: 90 | Refills: 0 | OUTPATIENT
Start: 2018-04-19 | End: 2018-05-18

## 2018-04-19 RX ORDER — SUCRALFATE 1 G
10 TABLET ORAL
Qty: 70 | Refills: 0 | OUTPATIENT
Start: 2018-04-19 | End: 2018-04-25

## 2018-04-19 RX ADMIN — SODIUM CHLORIDE 100 MILLILITER(S): 9 INJECTION INTRAMUSCULAR; INTRAVENOUS; SUBCUTANEOUS at 11:28

## 2018-04-19 RX ADMIN — Medication 1 GRAM(S): at 11:30

## 2018-04-19 RX ADMIN — Medication 1 GRAM(S): at 05:14

## 2018-04-19 NOTE — DISCHARGE NOTE ADULT - PATIENT PORTAL LINK FT
You can access the [facility] Patient Portal, offered by [facility], by registering with the following website: [email or web address]

## 2018-04-19 NOTE — DISCHARGE NOTE ADULT - PLAN OF CARE
Diagnose and treat Take the medications prescribed and follow up with the medications Follow up with the oncologist, patient needs outpatient oncology follow up treat the underlying cause Follow up with Gi and oncologist

## 2018-04-19 NOTE — PROGRESS NOTE ADULT - SUBJECTIVE AND OBJECTIVE BOX
Discharge Note:  ROSA M PAREKH  [facility] T2-3A 026 A ([facility] T2-3A)            Patient was evaluated and examined  by bedside, tolerating diet well, no abdominal pain, no N/V/D. patient refused to have CT abdomen/pelvis and CXR to be done for diagnostic work up.            REVIEW OF SYSTEMS:  please see pertinent positives mentioned above, all other 12 ROS negative      T(C): , Max: 36.8 (04-19-18 @ 05:33)  HR: 76 (04-19-18 @ 06:05)  BP: 102/66 (04-19-18 @ 06:05)  RR: 17 (04-19-18 @ 05:33)  SpO2: 99% (04-19-18 @ 07:50)  CAPILLARY BLOOD GLUCOSE          PHYSICAL EXAM:  General: NAD, AAOX3, patient is laying comfortably in bed, cachectic  HEENT: AT, NC, Supple, NO JVD, NO CB  Lungs: CTA B/L, no wheezing, no rhonchi  CVS: normal S1, S2, RRR, NO M/G/R  Abdomen: soft, bowel sounds present, non-tender, non-distended  Extremities: no edema, no clubbing, no cyanosis, positive peripheral pulses b/l  Neuro: no acute focal neurological deficits  Skin: no rush, no ecchymosis      LAB  CBC  Date: 04-18-18 @ 10:45  Mean cell Xsxywlwoka32.0  Mean cell Hemoglobin Conc31.9  Mean cell Volum 90.8  Platelet count-Automate 223  RBC Count 2.62  Red Cell Distrib Width16.7  WBC Count11.50  % Albumin, Urine--  Hematocrit 23.8  Hemoglobin 7.6  CBC  Date: 04-17-18 @ 18:40  Mean cell Urrwnzckjp24.4  Mean cell Hemoglobin Conc31.2  Mean cell Volum 91.1  Platelet count-Automate 273  RBC Count 3.13  Red Cell Distrib Width17.0  WBC Count16.30  % Albumin, Urine--  Hematocrit 28.5  Hemoglobin 8.9    BMP  04-18-18 @ 10:45  Blood Gas Arterial-Calcium,Ionized--  Blood Urea Nitrogen, Serum 19 mg/dL [10 - 20]  Carbon Dioxide, Serum23 mmol/L [17 - 32]  Chloride, Ssbjy237 mmol/L [98 - 110]  Creatinie, Serum1.2 mg/dL [0.7 - 1.5]  Glucose, Serum97 mg/dL [70 - 99]  Potassium, Serum4.4 mmol/L [3.5 - 5.0]  Sodium, Serum 134 mmol/L<L> [135 - 146]  BMP  04-17-18 @ 18:40  Blood Gas Arterial-Calcium,Ionized--  Blood Urea Nitrogen, Serum 23 mg/dL<H> [10 - 20]  Carbon Dioxide, Serum22 mmol/L [17 - 32] [Hemolyzed. Interpret with caution]  Chloride, Serum95 mmol/L<L> [98 - 110]  Creatinie, Serum1.4 mg/dL [0.7 - 1.5]  Glucose, Bhgpa372 mg/dL<H> [70 - 99]  Potassium, Serum8.3 mmol/L<HH> [3.5 - 5.0] [moderate hemolysis  TYPE:(C=Critical, N=Notification, A=Abnormal) _  TESTS: K  DATE/TIME CALLED: _04/17/18 20:58  CALLED TO: _DR. SAVAGE  READ BACK (2 Patient Identifiers)(Y/N): _Y  READ BACK VALUES (Y/N): _Y  CALLED BY: _ML]  Sodium, Serum 130 mmol/L<L> [135 - 146]        PT/INR - ( 17 Apr 2018 18:40 )   PT: 14.60 sec;   INR: 1.34 ratio         PTT - ( 17 Apr 2018 18:40 )  PTT:29.9 sec              Medications:  ALBUTerol    90 MICROgram(s) HFA Inhaler 2 Puff(s) Inhalation every 6 hours PRN  buDESOnide  80 MICROgram(s)/formoterol 4.5 MICROgram(s) Inhaler 2 Puff(s) Inhalation two times a day  ferrous    sulfate 325 milliGRAM(s) Oral daily  heparin  Injectable 5000 Unit(s) SubCutaneous every 8 hours  mesalamine DR Capsule 800 milliGRAM(s) Oral three times a day  montelukast 10 milliGRAM(s) Oral at bedtime  ondansetron Injectable 4 milliGRAM(s) IV Push every 6 hours PRN  pantoprazole    Tablet 40 milliGRAM(s) Oral two times a day  sodium chloride 0.9%. 1000 milliLiter(s) IV Continuous <Continuous>  sucralfate 1 Gram(s) Oral four times a day  sucralfate suspension 1 Gram(s) Oral two times a day        Assessment and Plan:  1. Nausea, Vomiting, Decreased PO Intake- resolved post medical therapy. h/o gastritis/duodenitis  - continue  antiemetics, c/w carafate, Protonix BID   - Received Medical records from Dr Villanueva   -patient tolerating diet well  -leukocytosis 2/2 stress reaction, no signs of infection, pt appears nontoxic   -Patient was referred to f/up with outpatient GI specialist    2.Iron Deficiency Anemia after Radiation:   -baseline hgb before radiation was 10-11, currently at new baseline of 8, no signs of lower GI bleed, guaiac neg  - started on IRon supplement    3 H/o Weight loss   40 lbs in couple of months   - Can be metastasis of cancer  - CXR and CT abdomne and pelvis - patient refused.   - SPoke with the patient has scheduled PET scan procedure as an outpatient and requests to do all work up as an outpatient    4. Crohn's Disease: c/w mesalamine    5. Asthma: c/w symbicort, proair, montelukast    6. DVT PPx: hep 5000q8    7. CKD Stage 3A: avoid nephrotoxic agents    8. Dispo- d/c home today    Patient verbalized good understanding of d/c instructions and agreed with d/c plan
NEPHROLOGY FOLLOW UP:    Pt seen and examined earlier today.  For dc home today.  Renal function improved.  Pt requesting further w/u as oupt.    PAST MEDICAL & SURGICAL HISTORY:  Chronic kidney disease  Acid reflux  Asthma  Crohn disease  Renal cell carcinoma  Bilateral femoral hernia  H/O hydrocele  H/O colonoscopy with polypectomy    Allergies:  contrast media (gadolinium-based) (Hives)  sulfa drugs (Unknown)    Home Medications Reviewed    SOCIAL HISTORY:  Denies ETOH,Smoking,   FAMILY HISTORY:        REVIEW OF SYSTEMS:  All other review of systems is negative unless indicated above.    PHYSICAL EXAM:  NAD  Awake and alert  thin  dry mm  cta b/l  rrr  soft, no cvat  no edema  no [facility] Medications:   MEDICATIONS  (STANDING):  buDESOnide  80 MICROgram(s)/formoterol 4.5 MICROgram(s) Inhaler 2 Puff(s) Inhalation two times a day  ferrous    sulfate 325 milliGRAM(s) Oral daily  heparin  Injectable 5000 Unit(s) SubCutaneous every 8 hours  mesalamine DR Capsule 800 milliGRAM(s) Oral three times a day  montelukast 10 milliGRAM(s) Oral at bedtime  pantoprazole    Tablet 40 milliGRAM(s) Oral two times a day  sodium chloride 0.9%. 1000 milliLiter(s) (100 mL/Hr) IV Continuous <Continuous>  sucralfate 1 Gram(s) Oral four times a day  sucralfate suspension 1 Gram(s) Oral two times a day        VITALS:  T(F): 98.2 (18 @ 05:33), Max: 98.2 (18 @ 05:33)  HR: 76 (18 @ 06:05)  BP: 102/66 (18 @ 06:05)  RR: 17 (18 @ 05:33)  SpO2: 99% (18 @ 07:50)  Wt(kg): --     @ 07:01  -   @ 07:00  --------------------------------------------------------  IN: 1000 mL / OUT: 0 mL / NET: 1000 mL     07:01  -   @ 07:00  --------------------------------------------------------  IN: 100 mL / OUT: 0 mL / NET: 100 mL          LABS:      134<L>  |  101  |  19  ----------------------------<  97  4.4   |  23  |  1.2    Ca    7.6<L>      2018 10:45  Mg     2.0                                 7.6    11.50 )-----------( 223      ( 2018 10:45 )             23.8       Urine Studies:  Urinalysis Basic - ( 2018 17:26 )    Color: Yellow / Appearance: Cloudy / S.020 / pH:   Gluc:  / Ketone: Negative  / Bili: Negative / Urobili: 0.2 mg/dL   Blood:  / Protein: 30 mg/dL / Nitrite: Negative   Leuk Esterase: Small / RBC:  / WBC 10-25 /HPF   Sq Epi:  / Non Sq Epi: Few /HPF / Bacteria: Moderate /HPF          RADIOLOGY & ADDITIONAL STUDIES:
SUBJECTIVE:    Patient is a 72y old Male who presents with a chief complaint of radiation side effects (17 Apr 2018 23:35)    Currently admitted to medicine with the primary diagnosis of Gastritis     Today is hospital day 1d. No events overnight. C/o nausea     PAST MEDICAL & SURGICAL HISTORY  Chronic kidney disease  Acid reflux  Asthma  Crohn disease  Renal cell carcinoma  Bilateral femoral hernia  H/O hydrocele  H/O colonoscopy with polypectomy      ALLERGIES:  contrast media (gadolinium-based) (Hives)  sulfa drugs (Unknown)    MEDICATIONS:  STANDING MEDICATIONS  buDESOnide  80 MICROgram(s)/formoterol 4.5 MICROgram(s) Inhaler 2 Puff(s) Inhalation two times a day  ferrous    sulfate 325 milliGRAM(s) Oral daily  heparin  Injectable 5000 Unit(s) SubCutaneous every 8 hours  mesalamine DR Capsule 800 milliGRAM(s) Oral three times a day  montelukast 10 milliGRAM(s) Oral at bedtime  pantoprazole    Tablet 40 milliGRAM(s) Oral before breakfast  sodium chloride 0.9%. 1000 milliLiter(s) IV Continuous <Continuous>  sucralfate 1 Gram(s) Oral four times a day    PRN MEDICATIONS  ALBUTerol    90 MICROgram(s) HFA Inhaler 2 Puff(s) Inhalation every 6 hours PRN  ondansetron Injectable 4 milliGRAM(s) IV Push every 6 hours PRN    VITALS:   T(F): 96.2  HR: 77  BP: 110/64  RR: 18  SpO2: 100%    LABS:                        7.6    11.50 )-----------( 223      ( 18 Apr 2018 10:45 )             23.8     04-18    134<L>  |  101  |  19  ----------------------------<  97  4.4   |  23  |  1.2    Ca    7.6<L>      18 Apr 2018 10:45  Mg     2.0     04-18      PT/INR - ( 17 Apr 2018 18:40 )   PT: 14.60 sec;   INR: 1.34 ratio         PTT - ( 17 Apr 2018 18:40 )  PTT:29.9 sec      PHYSICAL EXAM:  GEN: No acute distress  LUNGS: Clear to auscultation bilaterally   HEART: S1/S2 present. RRR.   ABD: Soft, non-tender, non-distended. Bowel sounds present  EXT: NC/NC/NE/2+PP/WHITTAKER  NEURO: AAOX3

## 2018-04-19 NOTE — DISCHARGE NOTE ADULT - HOSPITAL COURSE
73 y/o M with PMHx R renal cell carcinoma s/p radiation dx March 2016, last radiation treatment in August 2017, finding of GI ulcer 2/2 radiation, inactive Crohns, acid reflux, and asthma presenting for chronic nausea, vomiting, and decreased appetite.    Patient is being discharged with protonix and carafate. patient refused EGD and CT scan as he will follow up with his oncologist and Gi doctor.

## 2018-04-19 NOTE — DISCHARGE NOTE ADULT - MEDICATION SUMMARY - MEDICATIONS TO TAKE
I will START or STAY ON the medications listed below when I get home from the hospital:    mesalamine 400 mg oral delayed release capsule  -- 2 cap(s) by mouth 3 times a day  -- Indication: For Crohn disease    ondansetron  -- 4 milligram(s) by mouth 3 times a day MDD:take no more than 3 tablets a day trake as needed    -- Indication: For Gastritis    montelukast 10 mg oral tablet  -- 1 tab(s) by mouth once a day (at bedtime)  -- Indication: For Asthma    sucralfate 1 g/10 mL oral suspension  -- 10 milliliter(s) by mouth 4 times a day   -- Indication: For Acid reflux    pantoprazole 40 mg oral delayed release tablet  -- 1 tab(s) by mouth 2 times a day  -- Indication: For Gastritis

## 2018-04-19 NOTE — PROGRESS NOTE ADULT - ASSESSMENT
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  anemia    for dc home  keep hydrated PO  f/u bmp 2-4 weeks  oupt f/u with gi and oncologist  oupt renal f/u

## 2018-04-19 NOTE — DISCHARGE NOTE ADULT - CARE PLAN
Principal Discharge DX:	Gastritis  Goal:	Diagnose and treat  Assessment and plan of treatment:	Take the medications prescribed and follow up with the medications  Secondary Diagnosis:	Renal cell carcinoma  Assessment and plan of treatment:	Follow up with the oncologist, patient needs outpatient oncology follow up  Secondary Diagnosis:	Weight loss  Goal:	treat the underlying cause  Assessment and plan of treatment:	Follow up with Gi and oncologist

## 2018-04-20 ENCOUNTER — EMERGENCY (EMERGENCY)
Facility: HOSPITAL | Age: 73
LOS: 0 days | Discharge: HOME | End: 2018-04-20
Attending: EMERGENCY MEDICINE | Admitting: EMERGENCY MEDICINE

## 2018-04-20 VITALS
DIASTOLIC BLOOD PRESSURE: 57 MMHG | SYSTOLIC BLOOD PRESSURE: 91 MMHG | OXYGEN SATURATION: 99 % | TEMPERATURE: 99 F | HEART RATE: 100 BPM | RESPIRATION RATE: 18 BRPM

## 2018-04-20 DIAGNOSIS — Z88.2 ALLERGY STATUS TO SULFONAMIDES: ICD-10-CM

## 2018-04-20 DIAGNOSIS — K27.9 PEPTIC ULCER, SITE UNSPECIFIED, UNSPECIFIED AS ACUTE OR CHRONIC, WITHOUT HEMORRHAGE OR PERFORATION: ICD-10-CM

## 2018-04-20 DIAGNOSIS — Z87.438 PERSONAL HISTORY OF OTHER DISEASES OF MALE GENITAL ORGANS: Chronic | ICD-10-CM

## 2018-04-20 DIAGNOSIS — Z98.890 OTHER SPECIFIED POSTPROCEDURAL STATES: Chronic | ICD-10-CM

## 2018-04-20 DIAGNOSIS — Z91.041 RADIOGRAPHIC DYE ALLERGY STATUS: ICD-10-CM

## 2018-04-20 DIAGNOSIS — K41.20 BILATERAL FEMORAL HERNIA, WITHOUT OBSTRUCTION OR GANGRENE, NOT SPECIFIED AS RECURRENT: Chronic | ICD-10-CM

## 2018-04-20 DIAGNOSIS — Z79.899 OTHER LONG TERM (CURRENT) DRUG THERAPY: ICD-10-CM

## 2018-04-20 DIAGNOSIS — R10.13 EPIGASTRIC PAIN: ICD-10-CM

## 2018-04-20 RX ORDER — DIPHENHYDRAMINE HYDROCHLORIDE AND LIDOCAINE HYDROCHLORIDE AND ALUMINUM HYDROXIDE AND MAGNESIUM HYDRO
30 KIT ONCE
Qty: 0 | Refills: 0 | Status: COMPLETED | OUTPATIENT
Start: 2018-04-20 | End: 2018-04-20

## 2018-04-20 RX ADMIN — DIPHENHYDRAMINE HYDROCHLORIDE AND LIDOCAINE HYDROCHLORIDE AND ALUMINUM HYDROXIDE AND MAGNESIUM HYDRO 30 MILLILITER(S): KIT at 14:48

## 2018-04-20 NOTE — ED ADULT NURSE NOTE - ASSOCIATED SYMPTOMS
tender on palpation to left upper quadrant  NO nausea or vomiting at this time.  Patient states he had 1 episode of vomiting last night and nausea last night and pain was worse last night

## 2018-04-20 NOTE — ED PROVIDER NOTE - OBJECTIVE STATEMENT
73 y/o M with hx of PUD with recent discharge presents with brief episode of epigastric pain last night after eating soup. Patient denies any pain right now. Pain is resolved. No CP, SOB. No nausea, vomiting. No bloody stools.

## 2018-05-04 DIAGNOSIS — T66.XXXA RADIATION SICKNESS, UNSPECIFIED, INITIAL ENCOUNTER: ICD-10-CM

## 2018-05-04 DIAGNOSIS — E86.0 DEHYDRATION: ICD-10-CM

## 2018-05-04 DIAGNOSIS — R11.2 NAUSEA WITH VOMITING, UNSPECIFIED: ICD-10-CM

## 2018-05-04 DIAGNOSIS — E87.5 HYPERKALEMIA: ICD-10-CM

## 2018-05-04 DIAGNOSIS — K50.90 CROHN'S DISEASE, UNSPECIFIED, WITHOUT COMPLICATIONS: ICD-10-CM

## 2018-05-04 DIAGNOSIS — Z53.29 PROCEDURE AND TREATMENT NOT CARRIED OUT BECAUSE OF PATIENT'S DECISION FOR OTHER REASONS: ICD-10-CM

## 2018-05-04 DIAGNOSIS — J45.909 UNSPECIFIED ASTHMA, UNCOMPLICATED: ICD-10-CM

## 2018-05-04 DIAGNOSIS — Z85.528 PERSONAL HISTORY OF OTHER MALIGNANT NEOPLASM OF KIDNEY: ICD-10-CM

## 2018-05-04 DIAGNOSIS — K25.9 GASTRIC ULCER, UNSPECIFIED AS ACUTE OR CHRONIC, WITHOUT HEMORRHAGE OR PERFORATION: ICD-10-CM

## 2018-05-04 DIAGNOSIS — E87.1 HYPO-OSMOLALITY AND HYPONATREMIA: ICD-10-CM

## 2018-05-04 DIAGNOSIS — K29.70 GASTRITIS, UNSPECIFIED, WITHOUT BLEEDING: ICD-10-CM

## 2018-05-04 DIAGNOSIS — E43 UNSPECIFIED SEVERE PROTEIN-CALORIE MALNUTRITION: ICD-10-CM

## 2018-05-04 DIAGNOSIS — Z92.3 PERSONAL HISTORY OF IRRADIATION: ICD-10-CM

## 2018-05-04 DIAGNOSIS — K21.9 GASTRO-ESOPHAGEAL REFLUX DISEASE WITHOUT ESOPHAGITIS: ICD-10-CM

## 2018-05-04 DIAGNOSIS — C64.9 MALIGNANT NEOPLASM OF UNSPECIFIED KIDNEY, EXCEPT RENAL PELVIS: ICD-10-CM

## 2018-05-04 DIAGNOSIS — K26.9 DUODENAL ULCER, UNSPECIFIED AS ACUTE OR CHRONIC, WITHOUT HEMORRHAGE OR PERFORATION: ICD-10-CM

## 2018-05-04 DIAGNOSIS — N18.3 CHRONIC KIDNEY DISEASE, STAGE 3 (MODERATE): ICD-10-CM

## 2018-05-04 DIAGNOSIS — R63.0 ANOREXIA: ICD-10-CM

## 2018-05-04 DIAGNOSIS — R63.4 ABNORMAL WEIGHT LOSS: ICD-10-CM

## 2018-05-04 DIAGNOSIS — D50.9 IRON DEFICIENCY ANEMIA, UNSPECIFIED: ICD-10-CM

## 2018-05-04 DIAGNOSIS — Z91.041 RADIOGRAPHIC DYE ALLERGY STATUS: ICD-10-CM

## 2018-05-04 DIAGNOSIS — Z88.2 ALLERGY STATUS TO SULFONAMIDES: ICD-10-CM

## 2018-05-26 ENCOUNTER — OUTPATIENT (OUTPATIENT)
Dept: OUTPATIENT SERVICES | Facility: HOSPITAL | Age: 73
LOS: 1 days | Discharge: HOME | End: 2018-05-26

## 2018-05-26 DIAGNOSIS — Z98.890 OTHER SPECIFIED POSTPROCEDURAL STATES: Chronic | ICD-10-CM

## 2018-05-26 DIAGNOSIS — R05 COUGH: ICD-10-CM

## 2018-05-26 DIAGNOSIS — R79.9 ABNORMAL FINDING OF BLOOD CHEMISTRY, UNSPECIFIED: ICD-10-CM

## 2018-05-26 DIAGNOSIS — Z87.438 PERSONAL HISTORY OF OTHER DISEASES OF MALE GENITAL ORGANS: Chronic | ICD-10-CM

## 2018-05-26 DIAGNOSIS — K41.20 BILATERAL FEMORAL HERNIA, WITHOUT OBSTRUCTION OR GANGRENE, NOT SPECIFIED AS RECURRENT: Chronic | ICD-10-CM

## 2018-05-28 ENCOUNTER — OUTPATIENT (OUTPATIENT)
Dept: OUTPATIENT SERVICES | Facility: HOSPITAL | Age: 73
LOS: 1 days | Discharge: HOME | End: 2018-05-28

## 2018-05-28 DIAGNOSIS — K41.20 BILATERAL FEMORAL HERNIA, WITHOUT OBSTRUCTION OR GANGRENE, NOT SPECIFIED AS RECURRENT: Chronic | ICD-10-CM

## 2018-05-28 DIAGNOSIS — Z87.438 PERSONAL HISTORY OF OTHER DISEASES OF MALE GENITAL ORGANS: Chronic | ICD-10-CM

## 2018-05-28 DIAGNOSIS — Z98.890 OTHER SPECIFIED POSTPROCEDURAL STATES: Chronic | ICD-10-CM

## 2018-05-31 ENCOUNTER — OUTPATIENT (OUTPATIENT)
Dept: OUTPATIENT SERVICES | Facility: HOSPITAL | Age: 73
LOS: 1 days | Discharge: HOME | End: 2018-05-31

## 2018-05-31 DIAGNOSIS — K41.20 BILATERAL FEMORAL HERNIA, WITHOUT OBSTRUCTION OR GANGRENE, NOT SPECIFIED AS RECURRENT: Chronic | ICD-10-CM

## 2018-05-31 DIAGNOSIS — D64.9 ANEMIA, UNSPECIFIED: ICD-10-CM

## 2018-05-31 DIAGNOSIS — B96.89 OTHER SPECIFIED BACTERIAL AGENTS AS THE CAUSE OF DISEASES CLASSIFIED ELSEWHERE: ICD-10-CM

## 2018-05-31 DIAGNOSIS — Z87.438 PERSONAL HISTORY OF OTHER DISEASES OF MALE GENITAL ORGANS: Chronic | ICD-10-CM

## 2018-05-31 DIAGNOSIS — Z98.890 OTHER SPECIFIED POSTPROCEDURAL STATES: Chronic | ICD-10-CM

## 2018-05-31 DIAGNOSIS — R94.5 ABNORMAL RESULTS OF LIVER FUNCTION STUDIES: ICD-10-CM

## 2018-06-01 ENCOUNTER — OUTPATIENT (OUTPATIENT)
Dept: OUTPATIENT SERVICES | Facility: HOSPITAL | Age: 73
LOS: 1 days | Discharge: HOME | End: 2018-06-01

## 2018-06-01 DIAGNOSIS — Z87.438 PERSONAL HISTORY OF OTHER DISEASES OF MALE GENITAL ORGANS: Chronic | ICD-10-CM

## 2018-06-01 DIAGNOSIS — K41.20 BILATERAL FEMORAL HERNIA, WITHOUT OBSTRUCTION OR GANGRENE, NOT SPECIFIED AS RECURRENT: Chronic | ICD-10-CM

## 2018-06-01 DIAGNOSIS — Z98.890 OTHER SPECIFIED POSTPROCEDURAL STATES: Chronic | ICD-10-CM

## 2018-06-02 DIAGNOSIS — S71.009A UNSPECIFIED OPEN WOUND, UNSPECIFIED HIP, INITIAL ENCOUNTER: ICD-10-CM

## 2018-06-04 ENCOUNTER — OUTPATIENT (OUTPATIENT)
Dept: OUTPATIENT SERVICES | Facility: HOSPITAL | Age: 73
LOS: 1 days | Discharge: HOME | End: 2018-06-04

## 2018-06-04 DIAGNOSIS — K41.20 BILATERAL FEMORAL HERNIA, WITHOUT OBSTRUCTION OR GANGRENE, NOT SPECIFIED AS RECURRENT: Chronic | ICD-10-CM

## 2018-06-04 DIAGNOSIS — D64.9 ANEMIA, UNSPECIFIED: ICD-10-CM

## 2018-06-04 DIAGNOSIS — I10 ESSENTIAL (PRIMARY) HYPERTENSION: ICD-10-CM

## 2018-06-04 DIAGNOSIS — Z87.438 PERSONAL HISTORY OF OTHER DISEASES OF MALE GENITAL ORGANS: Chronic | ICD-10-CM

## 2018-06-04 DIAGNOSIS — Z98.890 OTHER SPECIFIED POSTPROCEDURAL STATES: Chronic | ICD-10-CM

## 2018-06-06 DIAGNOSIS — D64.9 ANEMIA, UNSPECIFIED: ICD-10-CM

## 2018-06-13 ENCOUNTER — OUTPATIENT (OUTPATIENT)
Dept: OUTPATIENT SERVICES | Facility: HOSPITAL | Age: 73
LOS: 1 days | Discharge: HOME | End: 2018-06-13

## 2018-06-13 DIAGNOSIS — K41.20 BILATERAL FEMORAL HERNIA, WITHOUT OBSTRUCTION OR GANGRENE, NOT SPECIFIED AS RECURRENT: Chronic | ICD-10-CM

## 2018-06-13 DIAGNOSIS — Z98.890 OTHER SPECIFIED POSTPROCEDURAL STATES: Chronic | ICD-10-CM

## 2018-06-13 DIAGNOSIS — K76.9 LIVER DISEASE, UNSPECIFIED: ICD-10-CM

## 2018-06-13 DIAGNOSIS — B96.89 OTHER SPECIFIED BACTERIAL AGENTS AS THE CAUSE OF DISEASES CLASSIFIED ELSEWHERE: ICD-10-CM

## 2018-06-13 DIAGNOSIS — Z87.438 PERSONAL HISTORY OF OTHER DISEASES OF MALE GENITAL ORGANS: Chronic | ICD-10-CM

## 2018-06-20 ENCOUNTER — APPOINTMENT (OUTPATIENT)
Dept: HEMATOLOGY ONCOLOGY | Facility: CLINIC | Age: 73
End: 2018-06-20

## 2018-06-21 ENCOUNTER — OUTPATIENT (OUTPATIENT)
Dept: OUTPATIENT SERVICES | Facility: HOSPITAL | Age: 73
LOS: 1 days | Discharge: HOME | End: 2018-06-21

## 2018-06-21 DIAGNOSIS — Z87.438 PERSONAL HISTORY OF OTHER DISEASES OF MALE GENITAL ORGANS: Chronic | ICD-10-CM

## 2018-06-21 DIAGNOSIS — A09 INFECTIOUS GASTROENTERITIS AND COLITIS, UNSPECIFIED: ICD-10-CM

## 2018-06-21 DIAGNOSIS — Z98.890 OTHER SPECIFIED POSTPROCEDURAL STATES: Chronic | ICD-10-CM

## 2018-06-21 DIAGNOSIS — R94.5 ABNORMAL RESULTS OF LIVER FUNCTION STUDIES: ICD-10-CM

## 2018-06-21 DIAGNOSIS — K41.20 BILATERAL FEMORAL HERNIA, WITHOUT OBSTRUCTION OR GANGRENE, NOT SPECIFIED AS RECURRENT: Chronic | ICD-10-CM

## 2018-06-22 ENCOUNTER — RX RENEWAL (OUTPATIENT)
Age: 73
End: 2018-06-22

## 2018-06-22 DIAGNOSIS — R19.7 DIARRHEA, UNSPECIFIED: ICD-10-CM

## 2018-06-28 ENCOUNTER — OUTPATIENT (OUTPATIENT)
Dept: OUTPATIENT SERVICES | Facility: HOSPITAL | Age: 73
LOS: 1 days | Discharge: HOME | End: 2018-06-28

## 2018-06-28 DIAGNOSIS — K41.20 BILATERAL FEMORAL HERNIA, WITHOUT OBSTRUCTION OR GANGRENE, NOT SPECIFIED AS RECURRENT: Chronic | ICD-10-CM

## 2018-06-28 DIAGNOSIS — Z87.438 PERSONAL HISTORY OF OTHER DISEASES OF MALE GENITAL ORGANS: Chronic | ICD-10-CM

## 2018-06-28 DIAGNOSIS — R94.5 ABNORMAL RESULTS OF LIVER FUNCTION STUDIES: ICD-10-CM

## 2018-06-28 DIAGNOSIS — A09 INFECTIOUS GASTROENTERITIS AND COLITIS, UNSPECIFIED: ICD-10-CM

## 2018-06-28 DIAGNOSIS — Z98.890 OTHER SPECIFIED POSTPROCEDURAL STATES: Chronic | ICD-10-CM

## 2018-06-30 ENCOUNTER — OUTPATIENT (OUTPATIENT)
Dept: OUTPATIENT SERVICES | Facility: HOSPITAL | Age: 73
LOS: 1 days | Discharge: HOME | End: 2018-06-30

## 2018-06-30 DIAGNOSIS — A09 INFECTIOUS GASTROENTERITIS AND COLITIS, UNSPECIFIED: ICD-10-CM

## 2018-06-30 DIAGNOSIS — Z87.438 PERSONAL HISTORY OF OTHER DISEASES OF MALE GENITAL ORGANS: Chronic | ICD-10-CM

## 2018-06-30 DIAGNOSIS — Z98.890 OTHER SPECIFIED POSTPROCEDURAL STATES: Chronic | ICD-10-CM

## 2018-06-30 DIAGNOSIS — R94.5 ABNORMAL RESULTS OF LIVER FUNCTION STUDIES: ICD-10-CM

## 2018-06-30 DIAGNOSIS — K41.20 BILATERAL FEMORAL HERNIA, WITHOUT OBSTRUCTION OR GANGRENE, NOT SPECIFIED AS RECURRENT: Chronic | ICD-10-CM

## 2018-07-10 ENCOUNTER — OUTPATIENT (OUTPATIENT)
Dept: OUTPATIENT SERVICES | Facility: HOSPITAL | Age: 73
LOS: 1 days | Discharge: HOME | End: 2018-07-10

## 2018-07-10 DIAGNOSIS — Z87.438 PERSONAL HISTORY OF OTHER DISEASES OF MALE GENITAL ORGANS: Chronic | ICD-10-CM

## 2018-07-10 DIAGNOSIS — Z98.890 OTHER SPECIFIED POSTPROCEDURAL STATES: Chronic | ICD-10-CM

## 2018-07-10 DIAGNOSIS — I50.9 HEART FAILURE, UNSPECIFIED: ICD-10-CM

## 2018-07-10 DIAGNOSIS — K41.20 BILATERAL FEMORAL HERNIA, WITHOUT OBSTRUCTION OR GANGRENE, NOT SPECIFIED AS RECURRENT: Chronic | ICD-10-CM

## 2018-07-22 PROBLEM — Z78.9 ALCOHOL USE: Status: ACTIVE | Noted: 2017-04-21

## 2020-12-15 PROBLEM — N39.0 URINARY TRACT INFECTION WITHOUT HEMATURIA, SITE UNSPECIFIED: Status: RESOLVED | Noted: 2017-03-20 | Resolved: 2020-12-15

## 2023-03-21 NOTE — CONSULT NOTE ADULT - SUBJECTIVE AND OBJECTIVE BOX
Department of Anesthesiology  Preprocedure Note       Name:  Citlali Rodriguez   Age:  37 y.o.  :  1983                                          MRN:  [id number]         Date:  3/21/2023      Surgeon: Sage Valencia):  Nnamdi Houston MD    Procedure: Procedure(s):  EXCISION OF INFECTED LATERAL SCROTAL TRACT    Medications prior to admission:   Prior to Admission medications    Medication Sig Start Date End Date Taking?  Authorizing Provider   Semaglutide, 1 MG/DOSE, 4 MG/3ML SOPN Inject 1 mg into the skin every 7 days  Patient taking differently: Inject 1 mg into the skin every 7 days monday 3/14/23   CEDRIC Elizabeth NP   cephALEXin (KEFLEX) 500 MG capsule Take 1 capsule by mouth 3 times daily  Patient not taking: Reported on 3/20/2023 2/16/23   Nnamdi Houston MD   atorvastatin (LIPITOR) 20 MG tablet Take 1 tablet by mouth daily 2/10/23 2/10/24  CEDRIC Elizabeth NP   lisinopril (PRINIVIL;ZESTRIL) 5 MG tablet Take 0.5 tablets by mouth daily 2/10/23 2/11/24  CEDRIC Elizabeth NP   dapagliflozin (FARXIGA) 10 MG tablet Take 1 tablet by mouth every morning 22  CEDRIC Elizabeth NP   Blood Glucose Monitoring Suppl (ACCU-CHEK IGNACIA PLUS) w/Device KIT Use one strip to test Blood sugar as directed 22   CEDRIC Elizabeth NP   Lancets MISC Check blood sugar twice daily 22  CEDRIC Elizabeth NP       Current medications:    Current Facility-Administered Medications   Medication Dose Route Frequency Provider Last Rate Last Admin    ceFAZolin (ANCEF) 2000 mg in sterile water 20 mL IV syringe  2,000 mg IntraVENous On Call to [address], [place]           Allergies:  No Known Allergies    Problem List:    Patient Active Problem List   Diagnosis Code    Hypertension I10    Diabetes mellitus ([facility] [address] E11.9    Tobacco use disorder F17.200    History of cardiac pacemaker Z95.0    Immunization not carried out because of caregiver refusal Z28.82   
NEPHROLOGY CONSULTATION NOTE    71 yo wm with pmh as below, including right renal TCC with pelvic LN mets, s/p RT, no nephrectomy, with baseline SCr low 1's, p/w n/v/decreased po intake and significant wt loss.    PAST MEDICAL & SURGICAL HISTORY:  Chronic kidney disease  Acid reflux  Asthma  Crohn disease  Renal cell carcinoma  Bilateral femoral hernia  H/O hydrocele  H/O colonoscopy with polypectomy    Allergies:  contrast media (gadolinium-based) (Hives)  sulfa drugs (Unknown)    Home Medications Reviewed    SOCIAL HISTORY:  Denies ETOH,Smoking,   FAMILY HISTORY:        REVIEW OF SYSTEMS:  All other review of systems is negative unless indicated above.    PHYSICAL EXAM:  NAD  Awake and alert  thin  dry mm  cta b/l  rrr  soft, no cvat  no edema  no [facility] Medications:   MEDICATIONS  (STANDING):  buDESOnide  80 MICROgram(s)/formoterol 4.5 MICROgram(s) Inhaler 2 Puff(s) Inhalation two times a day  ferrous    sulfate 325 milliGRAM(s) Oral daily  heparin  Injectable 5000 Unit(s) SubCutaneous every 8 hours  mesalamine DR Capsule 2400 milliGRAM(s) Oral daily  montelukast 10 milliGRAM(s) Oral at bedtime  pantoprazole    Tablet 40 milliGRAM(s) Oral before breakfast  sodium chloride 0.9%. 1000 milliLiter(s) (100 mL/Hr) IV Continuous <Continuous>  sucralfate 1 Gram(s) Oral four times a day        VITALS:  T(F): 96.2 (04-18-18 @ 12:29), Max: 98.3 (04-17-18 @ 23:59)  HR: 77 (04-18-18 @ 12:29)  BP: 110/64 (04-18-18 @ 12:29)  RR: 18 (04-18-18 @ 12:29)  SpO2: 100% (04-17-18 @ 23:59)  Wt(kg): --    04-17 @ 07:01  -  04-18 @ 07:00  --------------------------------------------------------  IN: 1000 mL / OUT: 0 mL / NET: 1000 mL      Height (cm): 187.96 (04-18 @ 10:28)  Weight (kg): 68 (04-18 @ 06:12)  BMI (kg/m2): 19.2 (04-18 @ 10:28)  BSA (m2): 1.92 (04-18 @ 10:28)    LABS:  04-18    134<L>  |  101  |  19  ----------------------------<  97  4.4   |  23  |  1.2    Ca    7.6<L>      18 Apr 2018 10:45  Mg     2.0     04-18                            7.6    11.50 )-----------( 223      ( 18 Apr 2018 10:45 )             23.8       Urine Studies:        RADIOLOGY & ADDITIONAL STUDIES:
Patient is a 72y old  Male who presents with a chief complaint of radiation side effects (17 Apr 2018 23:35)      HPI:  73 y/o M with PMHx R renal cell carcinoma s/p radiation dx March 2017, last radiation treatment in August 2017, finding of GI ulcer 2/2 radiation, inactive Crohns, acid reflux, and asthma presenting for chronic nausea, vomiting, and decreased appetite.     Pt started having on and off abdominal pain 20- 30 minutes after eating, with nausea, vomiting with streaks of blood   at the end of the vomitus. Pt also states he has since lost 40 pounds unintentionally. For these sx pt had EGD done by GI Dr Villanueva 2 weeks ago, which as per him showed multiple duodenal ulcer. Pt's last colonoscopy was done 3 years ago, which showed +Crohns and 2 polyps, but no malignancy; he is scheduled for a repeat colo this year. He also receives epogen shot and iv iron (last dose 2 weeks ago) for his anemia.     The pt states he has also developed iron def anemia after the radiation requiring venofer.   On Monday, pt ate sphagetti, eggs and started having severe epigastric pain and vomited 3-4 times and noticed streaks of blood and called Dr Villanueva, who asked him to go to the ER. As per pt, Dr Villanueva attributes all of these sx to radiation side effects.    No h/o ASA, NSAIDS use/ any blood thinners.     Other ROS : Negative    PAST MEDICAL & SURGICAL HISTORY:  Chronic kidney disease  Acid reflux  Asthma  Crohn disease  Renal cell carcinoma  Bilateral femoral hernia  H/O hydrocele  H/O colonoscopy with polypectomy        MEDICATIONS  (STANDING):  buDESOnide  80 MICROgram(s)/formoterol 4.5 MICROgram(s) Inhaler 2 Puff(s) Inhalation two times a day  ferrous    sulfate 325 milliGRAM(s) Oral daily  heparin  Injectable 5000 Unit(s) SubCutaneous every 8 hours  mesalamine DR Capsule 800 milliGRAM(s) Oral three times a day  montelukast 10 milliGRAM(s) Oral at bedtime  pantoprazole    Tablet 40 milliGRAM(s) Oral before breakfast  sodium chloride 0.9%. 1000 milliLiter(s) (100 mL/Hr) IV Continuous <Continuous>  sucralfate 1 Gram(s) Oral four times a day    MEDICATIONS  (PRN):  ALBUTerol    90 MICROgram(s) HFA Inhaler 2 Puff(s) Inhalation every 6 hours PRN Shortness of Breath and/or Wheezing  ondansetron Injectable 4 milliGRAM(s) IV Push every 6 hours PRN Nausea and/or Vomiting      Allergies    contrast media (gadolinium-based) (Hives)  sulfa drugs (Unknown)      FAMILY HISTORY:      SOCIAL    REVIEW OF SYSTEMS    Vital Signs Last 24 Hrs  T(C): 35.7 (18 Apr 2018 12:29), Max: 36.8 (17 Apr 2018 23:59)  T(F): 96.2 (18 Apr 2018 12:29), Max: 98.3 (17 Apr 2018 23:59)  HR: 77 (18 Apr 2018 12:29) (72 - 93)  BP: 110/64 (18 Apr 2018 12:29) (97/62 - 110/64)  BP(mean): --  RR: 18 (18 Apr 2018 12:29) (18 - 20)  SpO2: 100% (17 Apr 2018 23:59) (100% - 100%)    GENERAL:  no distress. AAO*3  HEENT:  NC/AT,  anicteric  CHEST:   no increased effort, breath sounds clear  HEART:  Regular rhythm  ABDOMEN:  Soft, non-tender, non-distended, normoactive bowel sounds,   EXTEREMITIES:  no cyanosis, no edema  Rectal exam: Guaiac negative on admission      CBC Full  -  ( 18 Apr 2018 10:45 )  WBC Count : 11.50 K/uL  Hemoglobin : 7.6 g/dL from baseline- hb 8 (Feb 2018)  Hematocrit : 23.8 %  Platelet Count - Automated : 223 K/uL  Mean Cell Volume : 90.8 fL  Mean Cell Hemoglobin : 29.0 pg  Mean Cell Hemoglobin Concentration : 31.9 g/dL  Auto Neutrophil # : 9.77 K/uL  Auto Lymphocyte # : 0.58 K/uL  Auto Monocyte # : 0.70 K/uL  Auto Eosinophil # : 0.36 K/uL  Auto Basophil # : 0.02 K/uL  Auto Neutrophil % : 85.0 %  Auto Lymphocyte % : 5.0 %  Auto Monocyte % : 6.1 %  Auto Eosinophil % : 3.1 %  Auto Basophil % : 0.2 %      Hemoglobin: 7.6 g/dL (04-18-18 @ 10:45)  Hemoglobin: 8.9 g/dL (04-17-18 @ 18:40)  INR: 1.34 ratio (04-17-18 @ 18:40)      PT/INR - ( 17 Apr 2018 18:40 )   PT: 14.60 sec;   INR: 1.34 ratio         PTT - ( 17 Apr 2018 18:40 )  PTT:29.9 sec    04-18    134<L>  |  101  |  19  ----------------------------<  97  4.4   |  23  |  1.2    Ca    7.6<L>      18 Apr 2018 10:45  Mg     2.0     04-18

## 2023-07-06 NOTE — ED ADULT TRIAGE NOTE - NS ED NOTE AC HIGH RISK COUNTRIES
No